# Patient Record
Sex: FEMALE | Race: WHITE | URBAN - NONMETROPOLITAN AREA
[De-identification: names, ages, dates, MRNs, and addresses within clinical notes are randomized per-mention and may not be internally consistent; named-entity substitution may affect disease eponyms.]

---

## 2019-03-12 ENCOUNTER — OPTICAL OFFICE (OUTPATIENT)
Dept: URBAN - NONMETROPOLITAN AREA CLINIC 4 | Facility: CLINIC | Age: 39
Setting detail: OPHTHALMOLOGY
End: 2019-03-12
Payer: COMMERCIAL

## 2019-03-12 ENCOUNTER — RX ONLY (RX ONLY)
Age: 39
End: 2019-03-12

## 2019-03-12 ENCOUNTER — DOCTOR'S OFFICE (OUTPATIENT)
Dept: URBAN - NONMETROPOLITAN AREA CLINIC 1 | Facility: CLINIC | Age: 39
Setting detail: OPHTHALMOLOGY
End: 2019-03-12
Payer: COMMERCIAL

## 2019-03-12 DIAGNOSIS — H52.13: ICD-10-CM

## 2019-03-12 DIAGNOSIS — Z01.00: ICD-10-CM

## 2019-03-12 PROCEDURE — 92015 DETERMINE REFRACTIVE STATE: CPT | Performed by: OPTOMETRIST

## 2019-03-12 PROCEDURE — V2020 VISION SVCS FRAMES PURCHASES: HCPCS | Performed by: OPTOMETRIST

## 2019-03-12 PROCEDURE — V2100 LENS SPHER SINGLE PLANO 4.00: HCPCS | Performed by: OPTOMETRIST

## 2019-03-12 PROCEDURE — 92310 CONTACT LENS FITTING OU: CPT | Performed by: OPTOMETRIST

## 2019-03-12 PROCEDURE — 92004 COMPRE OPH EXAM NEW PT 1/>: CPT | Performed by: OPTOMETRIST

## 2019-03-12 ASSESSMENT — REFRACTION_MANIFEST
OS_VA3: 20/
OS_SPHERE: -3.25
OS_VA2: 20/20
OD_SPHERE: -3.25
OD_VA3: 20/
OU_VA: 20/
OS_CYLINDER: SPH
OS_VA2: 20/
OD_VA2: 20/
OU_VA: 20/
OS_VA1: 20/
OD_VA3: 20/
OD_VA1: 20/
OS_VA3: 20/
OD_VA2: 20/20
OD_VA1: 20/20
OD_CYLINDER: SPH
OS_VA1: 20/20

## 2019-03-12 ASSESSMENT — REFRACTION_CURRENTRX
OS_VPRISM_DIRECTION: SV
OS_OVR_VA: 20/
OD_OVR_VA: 20/
OS_AXIS: 180
OS_OVR_VA: 20/
OD_OVR_VA: 20/
OS_CYLINDER: 0.00
OD_OVR_VA: 20/
OS_SPHERE: -3.75
OD_CYLINDER: 0.00
OD_AXIS: 180
OD_SPHERE: -3.75
OD_VPRISM_DIRECTION: SV
OS_OVR_VA: 20/

## 2019-03-12 ASSESSMENT — REFRACTION_AUTOREFRACTION
OS_AXIS: 180
OS_CYLINDER: 0.00
OD_AXIS: 180
OD_SPHERE: -3.25
OS_SPHERE: -3.25
OD_CYLINDER: 0.00

## 2019-03-12 ASSESSMENT — SPHEQUIV_DERIVED
OS_SPHEQUIV: -3.25
OD_SPHEQUIV: -3.25

## 2019-03-12 ASSESSMENT — CONFRONTATIONAL VISUAL FIELD TEST (CVF)
OD_FINDINGS: FULL
OS_FINDINGS: FULL

## 2019-03-12 ASSESSMENT — VISUAL ACUITY
OS_BCVA: 20/25-1
OD_BCVA: 20/20-2

## 2021-04-12 ENCOUNTER — HOSPITAL ENCOUNTER (EMERGENCY)
Facility: HOSPITAL | Age: 41
Discharge: HOME | End: 2021-04-12
Attending: EMERGENCY MEDICINE | Admitting: EMERGENCY MEDICINE
Payer: COMMERCIAL

## 2021-04-12 VITALS
TEMPERATURE: 98.1 F | WEIGHT: 170.64 LBS | RESPIRATION RATE: 18 BRPM | SYSTOLIC BLOOD PRESSURE: 156 MMHG | OXYGEN SATURATION: 97 % | HEART RATE: 99 BPM | DIASTOLIC BLOOD PRESSURE: 82 MMHG

## 2021-04-12 DIAGNOSIS — K04.7 DENTAL INFECTION: Primary | ICD-10-CM

## 2021-04-12 PROCEDURE — 99283 EMERGENCY DEPT VISIT LOW MDM: CPT

## 2021-04-12 PROCEDURE — 99284 EMERGENCY DEPT VISIT MOD MDM: CPT | Performed by: EMERGENCY MEDICINE

## 2021-04-12 RX ORDER — CLINDAMYCIN HYDROCHLORIDE 300 MG/1
300 CAPSULE ORAL 3 TIMES DAILY
Qty: 21 CAPSULE | Refills: 0 | Status: SHIPPED | OUTPATIENT
Start: 2021-04-12 | End: 2021-04-19

## 2021-04-12 RX ORDER — CLINDAMYCIN HYDROCHLORIDE 150 MG/1
300 CAPSULE ORAL ONCE
Status: COMPLETED | OUTPATIENT
Start: 2021-04-12 | End: 2021-04-12

## 2021-04-12 RX ADMIN — CLINDAMYCIN HYDROCHLORIDE 300 MG: 150 CAPSULE ORAL at 03:22

## 2021-04-12 NOTE — DISCHARGE INSTRUCTIONS
Here is a list of  dental clinics that may be able to help you  Keep in mind that these clinics do not have to see you or any other patient  Also, these clinics are not connected to the [facility] or [address] [place] [address] system but if they agree to see you as a patient it is easy for them to call  Medical Records Department to have your records faxed to them  [facility]:  [address]   [address]   [id number]:   [address]   [address], 210 [facility]   [phone]     [facility]:  [address] [facility], [address] [facility]  [phone]    [address]:  [address] [place], [address] [facility] [address]  [phone]    [address]:  45 [facility], [address]  ([place]:  [address] [facility], 13[address] [facility]  [phone]    [facility]:  [address], [address] [facility]  [phone]    [address] [address]:  [address] [facility] 74  [phone]    [address]:  [address]  [address]  [phone] [facility]6 [facility]  [address]   [address]   [phone]    [address]:  14[address] [facility], [address]  21  Associates:  [address], [address] [facility]  21    [address] [address]   [address]   [address]   [address] [facility], 210 [facility]   [phone]

## 2021-04-12 NOTE — ED PROVIDER NOTES
History  Chief Complaint   Patient presents with    Facial Swelling     Patient has right sided facial swelling from several bad teeth  Patient with chronically poor dentition continues to complain of increasing right lower jaw and tooth pain  She complains of swelling in that area  She states that she is not having any voice change or drooling or trouble breathing or swallowing  She has had no fevers  She is concerned about the increasing swelling and states she has not been able to schedule a dental appointment  History provided by:  Patient   used: No    Dental Pain  Location:  Lower  Lower teeth location:  31/RL 2nd molar, 30/RL 1st molar and 29/RL 2nd bicuspid  Quality:  Aching (swelling)  Severity:  Moderate  Onset quality:  Gradual  Duration: chronic  Timing:  Constant  Progression:  Worsening  Chronicity:  Chronic  Relieved by:  Nothing  Worsened by:  Nothing  Ineffective treatments:  None tried  Associated symptoms: facial pain and facial swelling    Associated symptoms: no difficulty swallowing, no drooling, no fever, no gum swelling, no headaches, no neck pain, no neck swelling, no oral bleeding and no oral lesions        None       History reviewed  No pertinent past medical history  Past Surgical History:   Procedure Laterality Date     SECTION         History reviewed  No pertinent family history  I have reviewed and agree with the history as documented  E-Cigarette/Vaping    E-Cigarette Use Never User      E-Cigarette/Vaping Substances     Social History     Tobacco Use    Smoking status: Current Every Day Smoker     Packs/day: 1 50     Types: Cigarettes    Smokeless tobacco: Never Used   Substance Use Topics    Alcohol use: Not Currently    Drug use: Not Currently       Review of Systems   Constitutional: Negative for chills and fever  HENT: Positive for facial swelling   Negative for drooling, ear pain, hearing loss, mouth sores, sore throat, trouble swallowing and voice change  Eyes: Negative for pain and discharge  Respiratory: Negative for cough, shortness of breath and wheezing  Cardiovascular: Negative for chest pain and palpitations  Gastrointestinal: Negative for abdominal pain, blood in stool, constipation, diarrhea, nausea and vomiting  Genitourinary: Negative for dysuria, flank pain, frequency and hematuria  Musculoskeletal: Negative for joint swelling, neck pain and neck stiffness  Skin: Negative for rash and wound  Neurological: Negative for dizziness, seizures, syncope, facial asymmetry and headaches  Psychiatric/Behavioral: Negative for hallucinations, self-injury and suicidal ideas  All other systems reviewed and are negative  Physical Exam  Physical Exam  Vitals signs and nursing note reviewed  Constitutional:       General: She is not in acute distress  Appearance: Normal appearance  She is well-developed  She is not ill-appearing or diaphoretic  HENT:      Head: Normocephalic and atraumatic  Right Ear: External ear normal       Left Ear: External ear normal       Mouth/Throat:      Lips: Pink  No lesions  Mouth: Mucous membranes are moist  No lacerations, oral lesions or angioedema  Dentition: Dental caries present  Tongue: No lesions  Tongue does not deviate from midline  Pharynx: Oropharynx is clear  Uvula midline  No oropharyngeal exudate or uvula swelling  Tonsils: No tonsillar exudate or tonsillar abscesses  Comments: Significant decay with associated swelling noted in teeth 31, 30, 29  Some swelling in the surrounding gingiva and gum  No fluctuant collection or abscess  The mandibular area in the swelling is minimally tender but no fluctuant collection there either  Patient is able to open and close the mouth without difficulty  The posterior pharynx is normal   Eyes:      General: No scleral icterus  Right eye: No discharge           Left eye: No discharge  Extraocular Movements: Extraocular movements intact  Conjunctiva/sclera: Conjunctivae normal    Neck:      Musculoskeletal: Normal range of motion and neck supple  Pulmonary:      Effort: Pulmonary effort is normal  No respiratory distress  Musculoskeletal: Normal range of motion  General: No deformity or signs of injury  Skin:     General: Skin is warm and dry  Coloration: Skin is not jaundiced or pale  Neurological:      General: No focal deficit present  Mental Status: She is alert and oriented to person, place, and time  Cranial Nerves: No cranial nerve deficit  Coordination: Coordination normal       Gait: Gait normal    Psychiatric:         Mood and Affect: Mood normal          Behavior: Behavior normal          Thought Content: Thought content normal          Judgment: Judgment normal          Vital Signs  ED Triage Vitals [04/12/21 0314]   Temperature Pulse Respirations Blood Pressure SpO2   98 1 °F (36 7 °C) 99 18 156/82 97 %      Temp Source Heart Rate Source Patient Position - Orthostatic VS BP Location FiO2 (%)   Temporal Monitor Sitting Right arm --      Pain Score       7           Vitals:    04/12/21 0314   BP: 156/82   Pulse: 99   Patient Position - Orthostatic VS: Sitting         Visual Acuity      ED Medications  Medications   clindamycin (CLEOCIN) capsule 300 mg (300 mg Oral Given 4/12/21 0322)       Diagnostic Studies  Results Reviewed     None                 No orders to display              Procedures  Procedures         ED Course                                           MDM  Number of Diagnoses or Management Options  Dental infection:   Diagnosis management comments: Patient provided with follow-up information for outpatient dental resources and given 1st dose of oral antibiotic in the emergency room and prescribed antibiotic        Disposition  Final diagnoses:   Dental infection     Time reflects when diagnosis was documented in both MDM as applicable and the Disposition within this note     Time User Action Codes Description Comment    4/12/2021  3:20 AM Lien Bravo Add [K04 7] Dental infection       ED Disposition     ED Disposition Condition Date/Time Comment    Discharge Stable Mon Apr 12, 2021  5:48 AM Talon Contreras discharge to home/self care  Follow-up Information     Follow up With Specialties Details Why Contact Info    Your primary care physician   As needed           Discharge Medication List as of 4/12/2021  3:22 AM      START taking these medications    Details   clindamycin (CLEOCIN) 300 MG capsule Take 1 capsule (300 mg total) by mouth 3 (three) times a day for 7 days, Starting Mon 4/12/2021, Until Mon 4/19/2021, Normal           No discharge procedures on file      PDMP Review     None          ED Provider  Electronically Signed by           Jaimie Hidalgo MD  04/12/21 9067

## 2021-06-12 ENCOUNTER — APPOINTMENT (EMERGENCY)
Dept: RADIOLOGY | Facility: HOSPITAL | Age: 41
End: 2021-06-12
Payer: COMMERCIAL

## 2021-06-12 ENCOUNTER — HOSPITAL ENCOUNTER (EMERGENCY)
Facility: HOSPITAL | Age: 41
Discharge: HOME | End: 2021-06-12
Attending: EMERGENCY MEDICINE | Admitting: EMERGENCY MEDICINE
Payer: COMMERCIAL

## 2021-06-12 VITALS
WEIGHT: 173.28 LBS | DIASTOLIC BLOOD PRESSURE: 88 MMHG | HEART RATE: 80 BPM | TEMPERATURE: 97.5 F | OXYGEN SATURATION: 100 % | RESPIRATION RATE: 20 BRPM | SYSTOLIC BLOOD PRESSURE: 160 MMHG

## 2021-06-12 DIAGNOSIS — R68.84 JAW PAIN: ICD-10-CM

## 2021-06-12 DIAGNOSIS — M79.601 RIGHT ARM PAIN: Primary | ICD-10-CM

## 2021-06-12 PROCEDURE — 73030 X-RAY EXAM OF SHOULDER: CPT

## 2021-06-12 PROCEDURE — 99283 EMERGENCY DEPT VISIT LOW MDM: CPT

## 2021-06-12 PROCEDURE — 73080 X-RAY EXAM OF ELBOW: CPT

## 2021-06-12 PROCEDURE — 99284 EMERGENCY DEPT VISIT MOD MDM: CPT | Performed by: EMERGENCY MEDICINE

## 2021-06-12 PROCEDURE — 70110 X-RAY EXAM OF JAW 4/> VIEWS: CPT
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
## 2021-10-01 ENCOUNTER — HOSPITAL ENCOUNTER (EMERGENCY)
Facility: HOSPITAL | Age: 41
Discharge: HOME | End: 2021-10-01
Attending: EMERGENCY MEDICINE | Admitting: EMERGENCY MEDICINE
Payer: COMMERCIAL

## 2021-10-01 ENCOUNTER — APPOINTMENT (OUTPATIENT)
Dept: RADIOLOGY | Facility: HOSPITAL | Age: 41
End: 2021-10-01
Payer: COMMERCIAL

## 2021-10-01 VITALS
RESPIRATION RATE: 22 BRPM | HEART RATE: 72 BPM | BODY MASS INDEX: 29.37 KG/M2 | TEMPERATURE: 97.6 F | OXYGEN SATURATION: 99 % | SYSTOLIC BLOOD PRESSURE: 126 MMHG | DIASTOLIC BLOOD PRESSURE: 78 MMHG | HEIGHT: 64 IN | WEIGHT: 172 LBS

## 2021-10-01 DIAGNOSIS — M62.838 TRAPEZIUS MUSCLE SPASM: ICD-10-CM

## 2021-10-01 DIAGNOSIS — G89.29 CHRONIC LEFT SHOULDER PAIN: Primary | ICD-10-CM

## 2021-10-01 DIAGNOSIS — M25.512 CHRONIC LEFT SHOULDER PAIN: Primary | ICD-10-CM

## 2021-10-01 LAB
ANION GAP SERPL CALCULATED.3IONS-SCNC: 6 MMOL/L (ref 4–13)
BUN SERPL-MCNC: 6 MG/DL (ref 5–25)
CALCIUM SERPL-MCNC: 8.6 MG/DL (ref 8.3–10.1)
CHLORIDE SERPL-SCNC: 107 MMOL/L (ref 100–108)
CO2 SERPL-SCNC: 28 MMOL/L (ref 21–32)
CREAT SERPL-MCNC: 0.89 MG/DL (ref 0.6–1.3)
GFR SERPL CREATININE-BSD FRML MDRD: 82 ML/MIN/1.73SQ M
GLUCOSE SERPL-MCNC: 102 MG/DL (ref 65–140)
POTASSIUM SERPL-SCNC: 5 MMOL/L (ref 3.5–5.3)
SODIUM SERPL-SCNC: 141 MMOL/L (ref 136–145)

## 2021-10-01 PROCEDURE — 73030 X-RAY EXAM OF SHOULDER: CPT

## 2021-10-01 PROCEDURE — 99284 EMERGENCY DEPT VISIT MOD MDM: CPT

## 2021-10-01 PROCEDURE — 80048 BASIC METABOLIC PNL TOTAL CA: CPT | Performed by: EMERGENCY MEDICINE

## 2021-10-01 PROCEDURE — 36415 COLL VENOUS BLD VENIPUNCTURE: CPT | Performed by: EMERGENCY MEDICINE

## 2021-10-01 PROCEDURE — 99284 EMERGENCY DEPT VISIT MOD MDM: CPT | Performed by: EMERGENCY MEDICINE

## 2021-10-01 RX ORDER — LIDOCAINE 50 MG/G
2 PATCH TOPICAL ONCE
Status: DISCONTINUED | OUTPATIENT
Start: 2021-10-01 | End: 2021-10-01 | Stop reason: HOSPADM

## 2021-10-01 RX ORDER — LIDOCAINE 50 MG/G
1 PATCH TOPICAL DAILY
Qty: 10 PATCH | Refills: 0 | Status: SHIPPED | OUTPATIENT
Start: 2021-10-01

## 2021-10-01 RX ORDER — NAPROXEN 375 MG/1
375 TABLET ORAL 2 TIMES DAILY WITH MEALS
Qty: 20 TABLET | Refills: 0 | Status: SHIPPED | OUTPATIENT
Start: 2021-10-01

## 2021-10-01 RX ORDER — NAPROXEN 250 MG/1
500 TABLET ORAL ONCE
Status: COMPLETED | OUTPATIENT
Start: 2021-10-01 | End: 2021-10-01

## 2021-10-01 RX ADMIN — NAPROXEN 500 MG: 250 TABLET ORAL at 08:41

## 2021-10-01 RX ADMIN — LIDOCAINE 2 PATCH: 50 PATCH TOPICAL at 08:41

## 2025-05-12 ENCOUNTER — DOCTOR'S OFFICE (OUTPATIENT)
Dept: URBAN - NONMETROPOLITAN AREA CLINIC 1 | Facility: CLINIC | Age: 45
Setting detail: OPHTHALMOLOGY
End: 2025-05-12
Payer: COMMERCIAL

## 2025-05-12 ENCOUNTER — OPTICAL OFFICE (OUTPATIENT)
Dept: URBAN - NONMETROPOLITAN AREA CLINIC 4 | Facility: CLINIC | Age: 45
Setting detail: OPHTHALMOLOGY
End: 2025-05-12
Payer: COMMERCIAL

## 2025-05-12 DIAGNOSIS — H52.13: ICD-10-CM

## 2025-05-12 DIAGNOSIS — H52.4: ICD-10-CM

## 2025-05-12 PROCEDURE — V2100 LENS SPHER SINGLE PLANO 4.00: HCPCS | Performed by: OPTOMETRIST

## 2025-05-12 PROCEDURE — 92004 COMPRE OPH EXAM NEW PT 1/>: CPT | Performed by: OPTOMETRIST

## 2025-05-12 PROCEDURE — V2020 VISION SVCS FRAMES PURCHASES: HCPCS | Performed by: OPTOMETRIST

## 2025-05-12 PROCEDURE — V2784 LENS POLYCARB OR EQUAL: HCPCS | Performed by: OPTOMETRIST

## 2025-05-12 PROCEDURE — V2100 LENS SPHER SINGLE PLANO 4.00: HCPCS | Mod: LT | Performed by: OPTOMETRIST

## 2025-05-12 PROCEDURE — V2784 LENS POLYCARB OR EQUAL: HCPCS | Mod: LT | Performed by: OPTOMETRIST

## 2025-05-12 PROCEDURE — 92015 DETERMINE REFRACTIVE STATE: CPT | Performed by: OPTOMETRIST

## 2025-05-12 ASSESSMENT — REFRACTION_MANIFEST
OD_CYLINDER: SPH
OS_VA2: 20/20
OD_VA2: 20/20
OS_VA1: 20/20-2
OS_ADD: +1.25
OD_VA1: 20/20-2
OD_ADD: +1.25
OS_SPHERE: -3.25
OS_CYLINDER: SPH
OD_SPHERE: -3.25

## 2025-05-12 ASSESSMENT — REFRACTION_CURRENTRX
OD_SPHERE: -3.00
OD_CYLINDER: -0.25
OD_VPRISM_DIRECTION: SV
OS_CYLINDER: 0.00
OS_AXIS: 180
OD_OVR_VA: 20/
OD_AXIS: 107
OS_VPRISM_DIRECTION: SV
OS_SPHERE: -3.00
OS_OVR_VA: 20/

## 2025-05-12 ASSESSMENT — REFRACTION_AUTOREFRACTION
OS_CYLINDER: -0.25
OS_SPHERE: -2.75
OD_SPHERE: -3.00
OD_AXIS: 122
OD_CYLINDER: -0.50
OS_AXIS: 157

## 2025-05-12 ASSESSMENT — VISUAL ACUITY
OD_BCVA: 20/30
OS_BCVA: 20/25-2

## 2025-05-12 ASSESSMENT — TONOMETRY
OD_IOP_MMHG: 14
OS_IOP_MMHG: 14

## 2025-05-12 ASSESSMENT — CONFRONTATIONAL VISUAL FIELD TEST (CVF)
OD_FINDINGS: FULL
OS_FINDINGS: FULL

## 2025-07-09 ENCOUNTER — TREATMENT (OUTPATIENT)
Dept: PSYCHIATRY | Facility: CLINIC | Age: 45
End: 2025-07-09

## 2025-07-09 DIAGNOSIS — F31.32 BIPOLAR AFFECTIVE DISORDER, CURRENTLY DEPRESSED, MODERATE (HCC): ICD-10-CM

## 2025-07-09 DIAGNOSIS — F31.32 BIPOLAR AFFECTIVE DISORDER, CURRENTLY DEPRESSED, MODERATE (HCC): Primary | ICD-10-CM

## 2025-07-09 RX ORDER — LURASIDONE HYDROCHLORIDE 40 MG/1
40 TABLET, FILM COATED ORAL
Qty: 30 TABLET | Refills: 2 | Status: SHIPPED | OUTPATIENT
Start: 2025-07-09 | End: 2025-07-10

## 2025-07-10 RX ORDER — LURASIDONE HYDROCHLORIDE 40 MG/1
40 TABLET, FILM COATED ORAL
Qty: 30 TABLET | Refills: 2 | Status: SHIPPED | OUTPATIENT
Start: 2025-07-10